# Patient Record
Sex: MALE | Race: WHITE | URBAN - METROPOLITAN AREA
[De-identification: names, ages, dates, MRNs, and addresses within clinical notes are randomized per-mention and may not be internally consistent; named-entity substitution may affect disease eponyms.]

---

## 2017-11-03 ENCOUNTER — OFFICE VISIT (OUTPATIENT)
Dept: INTERNAL MEDICINE CLINIC | Age: 37
End: 2017-11-03

## 2017-11-03 VITALS
TEMPERATURE: 98.7 F | OXYGEN SATURATION: 97 % | DIASTOLIC BLOOD PRESSURE: 90 MMHG | BODY MASS INDEX: 28.14 KG/M2 | HEART RATE: 67 BPM | SYSTOLIC BLOOD PRESSURE: 142 MMHG | HEIGHT: 69 IN | WEIGHT: 190 LBS | RESPIRATION RATE: 16 BRPM

## 2017-11-03 DIAGNOSIS — N53.9 MALE SEXUAL DYSFUNCTION: ICD-10-CM

## 2017-11-03 DIAGNOSIS — R05.8 NON-PRODUCTIVE COUGH: ICD-10-CM

## 2017-11-03 DIAGNOSIS — F41.9 ANXIETY: Primary | ICD-10-CM

## 2017-11-03 RX ORDER — CODEINE PHOSPHATE AND GUAIFENESIN 10; 100 MG/5ML; MG/5ML
SOLUTION ORAL
Qty: 118 ML | Refills: 0 | Status: SHIPPED | OUTPATIENT
Start: 2017-11-03 | End: 2017-12-08 | Stop reason: ALTCHOICE

## 2017-11-03 RX ORDER — LORAZEPAM 0.5 MG/1
0.5 TABLET ORAL
Qty: 60 TAB | Refills: 0 | Status: SHIPPED | OUTPATIENT
Start: 2017-11-03

## 2017-11-03 RX ORDER — TADALAFIL 20 MG/1
TABLET ORAL
Qty: 6 TAB | Refills: 0 | Status: SHIPPED | OUTPATIENT
Start: 2017-11-03 | End: 2017-12-08 | Stop reason: ALTCHOICE

## 2017-11-03 NOTE — PATIENT INSTRUCTIONS
Body Mass Index: Care Instructions  Your Care Instructions    Body mass index (BMI) can help you see if your weight is raising your risk for health problems. It uses a formula to compare how much you weigh with how tall you are. · A BMI lower than 18.5 is considered underweight. · A BMI between 18.5 and 24.9 is considered healthy. · A BMI between 25 and 29.9 is considered overweight. A BMI of 30 or higher is considered obese. If your BMI is in the normal range, it means that you have a lower risk for weight-related health problems. If your BMI is in the overweight or obese range, you may be at increased risk for weight-related health problems, such as high blood pressure, heart disease, stroke, arthritis or joint pain, and diabetes. If your BMI is in the underweight range, you may be at increased risk for health problems such as fatigue, lower protection (immunity) against illness, muscle loss, bone loss, hair loss, and hormone problems. BMI is just one measure of your risk for weight-related health problems. You may be at higher risk for health problems if you are not active, you eat an unhealthy diet, or you drink too much alcohol or use tobacco products. Follow-up care is a key part of your treatment and safety. Be sure to make and go to all appointments, and call your doctor if you are having problems. It's also a good idea to know your test results and keep a list of the medicines you take. How can you care for yourself at home? · Practice healthy eating habits. This includes eating plenty of fruits, vegetables, whole grains, lean protein, and low-fat dairy. · If your doctor recommends it, get more exercise. Walking is a good choice. Bit by bit, increase the amount you walk every day. Try for at least 30 minutes on most days of the week. · Do not smoke. Smoking can increase your risk for health problems. If you need help quitting, talk to your doctor about stop-smoking programs and medicines. These can increase your chances of quitting for good. · Limit alcohol to 2 drinks a day for men and 1 drink a day for women. Too much alcohol can cause health problems. If you have a BMI higher than 25  · Your doctor may do other tests to check your risk for weight-related health problems. This may include measuring the distance around your waist. A waist measurement of more than 40 inches in men or 35 inches in women can increase the risk of weight-related health problems. · Talk with your doctor about steps you can take to stay healthy or improve your health. You may need to make lifestyle changes to lose weight and stay healthy, such as changing your diet and getting regular exercise. If you have a BMI lower than 18.5  · Your doctor may do other tests to check your risk for health problems. · Talk with your doctor about steps you can take to stay healthy or improve your health. You may need to make lifestyle changes to gain or maintain weight and stay healthy, such as getting more healthy foods in your diet and doing exercises to build muscle. Where can you learn more? Go to http://angela-jared.info/. Enter S176 in the search box to learn more about \"Body Mass Index: Care Instructions. \"  Current as of: October 13, 2016  Content Version: 11.4  © 2034-4054 Healthwise, Incorporated. Care instructions adapted under license by [employer] (which disclaims liability or warranty for this information). If you have questions about a medical condition or this instruction, always ask your healthcare professional. [address] any warranty or liability for your use of this information.

## 2017-11-03 NOTE — PROGRESS NOTES
Patient presents for   Chief Complaint   Patient presents with    New Patient     re-establishing care    Anxiety    Sexual Problem     Fall risk assessment was not indicated. Depression screening was indicated Follow up questions were not indicated. 1. Have you been to the ER, urgent care clinic since your last visit? Hospitalized since your last visit? No    2. Have you seen or consulted any other health care providers outside of the [address] [place] [address] since your last visit? Include any pap smears or colon screening.  No

## 2017-11-03 NOTE — PROGRESS NOTES
Sherren Infield is a 40 y.o. male presenting today for New Patient (re-establishing care); Anxiety; and Sexual Problem  . HPI:  Sherren Infield presents to the office today to establish care. Patient is complaining of anxiety and sexual dysfunction. Patient noted he has increased anxiety especially in his work environment. Patient noted he has started a new job and can feel the stress and anxiety building up until he feels its out of control. He is also complaining of obtaining and maintaining an erection throughout intercourse. He also has had a non-productive cough x 3 days. Review of Systems   Constitutional: Negative for chills and fever. HENT: Negative for congestion and sore throat. Respiratory: Positive for cough. Negative for sputum production and shortness of breath. Cardiovascular: Negative for chest pain, palpitations and leg swelling. Gastrointestinal: Negative for abdominal pain, constipation, nausea and vomiting. Genitourinary: Negative for dysuria, frequency and urgency. Psychiatric/Behavioral: The patient is nervous/anxious. No Known Allergies        History reviewed. No pertinent past medical history. History reviewed. No pertinent surgical history. Social History     Social History    Marital status:      Spouse name: N/A    Number of children: N/A    Years of education: N/A     Occupational History    Not on file.      Social History Main Topics    Smoking status: Former Smoker    Smokeless tobacco: Former User    Alcohol use Yes      Comment: occasional    Drug use: No    Sexual activity: Yes     Partners: Female     Other Topics Concern    Not on file     Social History Narrative    No narrative on file       Patient does not have an advanced directive on file    Vitals:    11/03/17 0846   BP: 142/90   Pulse: 67   Resp: 16   Temp: 98.7 °F (37.1 °C)   TempSrc: Tympanic   SpO2: 97%   Weight: 190 lb (86.2 kg)   Height: 5' 9\" (1.753 m) PainSc:   0 - No pain       Physical Exam   Constitutional: No distress. HENT:   Head: Normocephalic. Right Ear: External ear normal.   Left Ear: External ear normal.   Mouth/Throat: No oropharyngeal exudate. Neck: Normal range of motion. Neck supple. Cardiovascular: Normal rate, regular rhythm and normal heart sounds. No murmur heard. Pulmonary/Chest: Effort normal and breath sounds normal.   Abdominal: Soft. Musculoskeletal: Normal range of motion. Neurological: He is alert. Skin: Skin is warm and dry. Psychiatric: Affect and judgment normal.   Nursing note and vitals reviewed. No results found for any previous visit. .No results found for any visits on 11/03/17. Assessment / Plan:      ICD-10-CM ICD-9-CM    1. Anxiety F41.9 300.00 LORazepam (ATIVAN) 0.5 mg tablet   2. Male sexual dysfunction N53.9 302.70 tadalafil (CIALIS) 20 mg tablet   3. Non-productive cough R05 786.2 guaiFENesin-codeine (ROBITUSSIN AC) 100-10 mg/5 mL solution       Ativan prn  Cialis rx given  Cheratussin for cough  If no improvement in sexual dysfunction will refer to Urology      Follow-up Disposition:  Return in about 1 month (around 12/3/2017). I asked the patient if he  had any questions and answered his  questions. The patient stated that he understands the treatment plan and agrees with the treatment plan      Discussed the patient's BMI with him. The BMI follow up plan is as follows:     I have reviewed/discussed the above normal BMI with the patient. I have recommended the following interventions: dietary management education, guidance, and counseling .

## 2017-11-03 NOTE — MR AVS SNAPSHOT
Visit Information Date & Time Provider Department Dept. Phone Encounter #  
 11/3/2017  8:45 AM Shira Alvarado NP [facility] INTERNAL MEDICINE OF Jan Alegria [phone] [id number] Upcoming Health Maintenance Date Due DTaP/Tdap/Td series (1 - Tdap) 2/18/2001 INFLUENZA AGE 9 TO ADULT 8/1/2017 Allergies as of 11/3/2017  Review Complete On: 11/3/2017 By: Shira Alvarado NP No Known Allergies Current Immunizations  Never Reviewed No immunizations on file. Not reviewed this visit You Were Diagnosed With   
  
 Codes Comments Anxiety    -  Primary ICD-10-CM: F41.9 ICD-9-CM: 300.00 Male sexual dysfunction     ICD-10-CM: N53.9 ICD-9-CM: 302.70 Non-productive cough     ICD-10-CM: R05 ICD-9-CM: 546. 2 Vitals BP Pulse Temp Resp Height(growth percentile) 142/90 (BP 1 Location: Left arm, BP Patient Position: Sitting) 67 98.7 °F (37.1 °C) (Tympanic) 16 5' 9\" (1.753 m) Weight(growth percentile) SpO2 BMI Smoking Status 190 lb (86.2 kg) 97% 28.06 kg/m2 Former Smoker BMI and BSA Data Body Mass Index Body Surface Area 28.06 kg/m 2 2.05 m 2 Preferred Pharmacy Pharmacy Name Phone [facility] [address] [place] [address] [phone] Your Updated Medication List  
  
   
This list is accurate as of: 11/3/17  9:37 AM.  Always use your most recent med list.  
  
  
  
  
 guaiFENesin-codeine 100-10 mg/5 mL solution Commonly known as:  ROBITUSSIN AC Take 1 to 2 tsp every 6 hours as needed for cough. Do not drive if taking this medication LORazepam 0.5 mg tablet Commonly known as:  ATIVAN Take 1 Tab by mouth every four (4) hours as needed for Anxiety. Max Daily Amount: 3 mg.  
  
 tadalafil 20 mg tablet Commonly known as:  CIALIS Take 1/2 to 1 tablet one hour prior to intercourse Prescriptions Printed Refills LORazepam (ATIVAN) 0.5 mg tablet 0 Sig: Take 1 Tab by mouth every four (4) hours as needed for Anxiety. Max Daily Amount: 3 mg. Class: Print Route: Oral  
 guaiFENesin-codeine (ROBITUSSIN AC) 100-10 mg/5 mL solution 0 Sig: Take 1 to 2 tsp every 6 hours as needed for cough. Do not drive if taking this medication Class: Print Prescriptions Sent to Pharmacy Refills  
 tadalafil (CIALIS) 20 mg tablet 0 Sig: Take 1/2 to 1 tablet one hour prior to intercourse Class: Normal  
 [facility] [address] [place] [address] [address]  #: [phone] Introducing [address]! [facility] introduces eCullet patient portal. Now you can access parts of your medical record, email your doctor's office, and request medication refills online. 1. In your internet browser, go to [email or web address] 2. Click on the First Time User? Click Here link in the Sign In box. You will see the New Member Sign Up page. 3. Enter your eCullet Access Code exactly as it appears below. You will not need to use this code after youve completed the sign-up process. If you do not sign up before the expiration date, you must request a new code. · eCullet Access Code: [email or web address] Expires: 2/1/2018  8:24 AM 
 
4. Enter the last four digits of your Social Security Number (xxxx) and Date of Birth (mm/dd/yyyy) as indicated and click Submit. You will be taken to the next sign-up page. 5. Create a RealMassivet ID. This will be your eCullet login ID and cannot be changed, so think of one that is secure and easy to remember. 6. Create a eCullet password. You can change your password at any time. 7. Enter your Password Reset Question and Answer. This can be used at a later time if you forget your password. 8. Enter your e-mail address. You will receive e-mail notification when new information is available in [address] 9. Click Sign Up. You can now view and download portions of your medical record. 10. Click the Download Summary menu link to download a portable copy of your medical information. If you have questions, please visit the Frequently Asked Questions section of the [employer] website. Remember, [employer] is NOT to be used for urgent needs. For medical emergencies, dial 911. Now available from your iPhone and Android! Please provide this summary of care documentation to your next provider. Your primary care clinician is listed as Sara Alcantar. If you have any questions after today's visit, please call [phone].

## 2017-12-08 ENCOUNTER — OFFICE VISIT (OUTPATIENT)
Dept: INTERNAL MEDICINE CLINIC | Age: 37
End: 2017-12-08

## 2017-12-08 VITALS
BODY MASS INDEX: 27.99 KG/M2 | WEIGHT: 189 LBS | TEMPERATURE: 98.2 F | SYSTOLIC BLOOD PRESSURE: 122 MMHG | RESPIRATION RATE: 16 BRPM | HEART RATE: 68 BPM | DIASTOLIC BLOOD PRESSURE: 80 MMHG | HEIGHT: 69 IN | OXYGEN SATURATION: 98 %

## 2017-12-08 DIAGNOSIS — N53.9 MALE SEXUAL DYSFUNCTION: ICD-10-CM

## 2017-12-08 DIAGNOSIS — R45.86 MOOD SWING: Primary | ICD-10-CM

## 2017-12-08 RX ORDER — ARIPIPRAZOLE 10 MG/1
10 TABLET ORAL DAILY
Qty: 30 TAB | Refills: 0 | Status: SHIPPED | OUTPATIENT
Start: 2017-12-08

## 2017-12-08 RX ORDER — SILDENAFIL 50 MG/1
50 TABLET, FILM COATED ORAL AS NEEDED
Qty: 6 TAB | Refills: 0 | Status: SHIPPED | OUTPATIENT
Start: 2017-12-08

## 2017-12-08 NOTE — PROGRESS NOTES
Patient presents for   Chief Complaint   Patient presents with    Anxiety     follow up     Fall risk assessment was not indicated. Depression screening was not indicated Follow up questions were not indicated. 1. Have you been to the ER, urgent care clinic since your last visit? Hospitalized since your last visit? No    2. Have you seen or consulted any other health care providers outside of the [address] [place] [address] since your last visit? Include any pap smears or colon screening.  No

## 2017-12-08 NOTE — PROGRESS NOTES
Kristy Gonzales is a 40 y.o. male presenting today for Anxiety (follow up)  . HPI:  Kristy Gonzales presents to the office today for anxiety follow-up. Patient was seen in the office 1 month ago for anxiety and given Ativan prn. Patient noted the Ativan worked but he felt oversedated. Mr Mahad Acuña now believes he is having mood swings instead of just anxiety. Patient would like to stop Cialis and try Viagra. Review of Systems   Respiratory: Negative for cough. Cardiovascular: Negative for chest pain and palpitations. Psychiatric/Behavioral: The patient is nervous/anxious. No Known Allergies    Current Outpatient Prescriptions   Medication Sig Dispense Refill    ARIPiprazole (ABILIFY) 10 mg tablet Take 1 Tab by mouth daily. 30 Tab 0    sildenafil citrate (VIAGRA) 50 mg tablet Take 1 Tab by mouth as needed. 6 Tab 0    LORazepam (ATIVAN) 0.5 mg tablet Take 1 Tab by mouth every four (4) hours as needed for Anxiety. Max Daily Amount: 3 mg. 60 Tab 0       History reviewed. No pertinent past medical history. History reviewed. No pertinent surgical history. Social History     Social History    Marital status:      Spouse name: N/A    Number of children: N/A    Years of education: N/A     Occupational History    Not on file. Social History Main Topics    Smoking status: Former Smoker    Smokeless tobacco: Former User    Alcohol use Yes      Comment: occasional    Drug use: No    Sexual activity: Yes     Partners: Female     Other Topics Concern    Not on file     Social History Narrative       Patient does not have an advanced directive on file    Vitals:    12/08/17 0842   BP: 122/80   Pulse: 68   Resp: 16   Temp: 98.2 °F (36.8 °C)   TempSrc: Tympanic   SpO2: 98%   Weight: 189 lb (85.7 kg)   Height: 5' 9\" (1.753 m)   PainSc:   0 - No pain       Physical Exam   Cardiovascular: Normal rate, regular rhythm and normal heart sounds.     Pulmonary/Chest: Effort normal. Psychiatric: Mood, memory, affect and judgment normal.   Nursing note and vitals reviewed. No results found for any previous visit. .No results found for any visits on 12/08/17. Assessment / Plan:      ICD-10-CM ICD-9-CM    1. Mood swing (HCC) F39 296.99 ARIPiprazole (ABILIFY) 10 mg tablet   2. Male sexual dysfunction N53.9 302.70 sildenafil citrate (VIAGRA) 50 mg tablet     Medication plan changed  Stop Ativan  Stop Cialis  Abilfy rx given  Viagra rx given  F/u in 1 month      Follow-up Disposition:  Return in about 1 month (around 1/8/2018). I asked the patient if he  had any questions and answered his  questions.   The patient stated that he understands the treatment plan and agrees with the treatment plan

## 2019-02-22 ENCOUNTER — TELEPHONE (OUTPATIENT)
Dept: INTERNAL MEDICINE CLINIC | Age: 39
End: 2019-02-22

## 2024-04-11 NOTE — MR AVS SNAPSHOT
Visit Information Date & Time Provider Department Dept. Phone Encounter #  
 12/8/2017  8:30 AM Stephy Cardoza NP [facility] INTERNAL MEDICINE OF Farzana Hodges [phone] [id number] Upcoming Health Maintenance Date Due DTaP/Tdap/Td series (1 - Tdap) 2/18/2001 Influenza Age 5 to Adult 8/1/2017 Allergies as of 12/8/2017  Review Complete On: 12/8/2017 By: Myra Kaye LPN No Known Allergies Current Immunizations  Never Reviewed No immunizations on file. Not reviewed this visit You Were Diagnosed With   
  
 Codes Comments [facility]    -  Primary ICD-10-CM: F39 
ICD-9-CM: 296.99 Male sexual dysfunction     ICD-10-CM: N53.9 ICD-9-CM: 302.70 Vitals BP Pulse Temp Resp Height(growth percentile) 122/80 (BP 1 Location: Left arm, BP Patient Position: Sitting) 68 98.2 °F (36.8 °C) (Tympanic) 16 5' 9\" (1.753 m) Weight(growth percentile) SpO2 BMI Smoking Status 189 lb (85.7 kg) 98% 27.91 kg/m2 Former Smoker BMI and BSA Data Body Mass Index Body Surface Area  
 27.91 kg/m 2 2.04 m 2 Preferred Pharmacy Pharmacy Name Phone [facility] [address] [place] [address] AT [place] [phone] Your Updated Medication List  
  
   
This list is accurate as of: 12/8/17  9:16 AM.  Always use your most recent med list.  
  
  
  
  
 ARIPiprazole 10 mg tablet Commonly known as:  ABILIFY Take 1 Tab by mouth daily. LORazepam 0.5 mg tablet Commonly known as:  ATIVAN Take 1 Tab by mouth every four (4) hours as needed for Anxiety. Max Daily Amount: 3 mg.  
  
 sildenafil citrate 50 mg tablet Commonly known as:  VIAGRA Take 1 Tab by mouth as needed. Prescriptions Sent to Pharmacy Refills ARIPiprazole (ABILIFY) 10 mg tablet 0 Sig: Take 1 Tab by mouth daily.   
 Class: Normal  
 Pharmacy: [facility] [address] [place] [address] [address] [place] [address] Ph #: [phone] Route: Oral  
 sildenafil citrate (VIAGRA) 50 mg tablet 0 Sig: Take 1 Tab by mouth as needed. Class: Normal  
 Pharmacy: [facility] [address] [place] [address] [address] Ph #: [phone] Route: Oral  
  
Introducing [facility]! Romayne Duster introduces Efficiency Exchange patient portal. Now you can access parts of your medical record, email your doctor's office, and request medication refills online. 1. In your internet browser, go to [email or web address] 2. Click on the First Time User? Click Here link in the Sign In box. You will see the New Member Sign Up page. 3. Enter your Efficiency Exchange Access Code exactly as it appears below. You will not need to use this code after youve completed the sign-up process. If you do not sign up before the expiration date, you must request a new code. · Efficiency Exchange Access Code: [email or web address] Expires: 2/1/2018  7:24 AM 
 
4. Enter the last four digits of your Social Security Number (xxxx) and Date of Birth (mm/dd/yyyy) as indicated and click Submit. You will be taken to the next sign-up page. 5. Create a Efficiency Exchange ID. This will be your Efficiency Exchange login ID and cannot be changed, so think of one that is secure and easy to remember. 6. Create a Efficiency Exchange password. You can change your password at any time. 7. Enter your Password Reset Question and Answer. This can be used at a later time if you forget your password. 8. Enter your e-mail address. You will receive e-mail notification when new information is available in [address] 9. Click Sign Up. You can now view and download portions of your medical record. 10. Click the Download Summary menu link to download a portable copy of your medical information.  
 
If you have questions, please visit the Frequently Asked Questions section of the [employer]. Remember, [employer] is NOT to be used for urgent needs. For medical emergencies, dial 911. Now available from your iPhone and Android! Please provide this summary of care documentation to your next provider. Your primary care clinician is listed as Jesse Carvalho. If you have any questions after today's visit, please call [phone]. no